# Patient Record
Sex: MALE | Race: WHITE | NOT HISPANIC OR LATINO
[De-identification: names, ages, dates, MRNs, and addresses within clinical notes are randomized per-mention and may not be internally consistent; named-entity substitution may affect disease eponyms.]

---

## 2019-02-11 PROBLEM — Z00.00 ENCOUNTER FOR PREVENTIVE HEALTH EXAMINATION: Status: ACTIVE | Noted: 2019-02-11

## 2019-03-01 ENCOUNTER — APPOINTMENT (OUTPATIENT)
Dept: CARDIOLOGY | Facility: CLINIC | Age: 69
End: 2019-03-01
Payer: MEDICARE

## 2019-03-01 ENCOUNTER — NON-APPOINTMENT (OUTPATIENT)
Age: 69
End: 2019-03-01

## 2019-03-01 VITALS
SYSTOLIC BLOOD PRESSURE: 180 MMHG | BODY MASS INDEX: 28.31 KG/M2 | HEIGHT: 72 IN | WEIGHT: 209 LBS | DIASTOLIC BLOOD PRESSURE: 88 MMHG

## 2019-03-01 VITALS
SYSTOLIC BLOOD PRESSURE: 180 MMHG | DIASTOLIC BLOOD PRESSURE: 88 MMHG | BODY MASS INDEX: 28.31 KG/M2 | WEIGHT: 209 LBS | OXYGEN SATURATION: 95 % | HEIGHT: 72 IN | HEART RATE: 91 BPM

## 2019-03-01 DIAGNOSIS — Z78.9 OTHER SPECIFIED HEALTH STATUS: ICD-10-CM

## 2019-03-01 DIAGNOSIS — Z87.891 PERSONAL HISTORY OF NICOTINE DEPENDENCE: ICD-10-CM

## 2019-03-01 DIAGNOSIS — Z86.19 PERSONAL HISTORY OF OTHER INFECTIOUS AND PARASITIC DISEASES: ICD-10-CM

## 2019-03-01 DIAGNOSIS — Z82.49 FAMILY HISTORY OF ISCHEMIC HEART DISEASE AND OTHER DISEASES OF THE CIRCULATORY SYSTEM: ICD-10-CM

## 2019-03-01 DIAGNOSIS — N28.1 CYST OF KIDNEY, ACQUIRED: ICD-10-CM

## 2019-03-01 PROCEDURE — 99203 OFFICE O/P NEW LOW 30 MIN: CPT

## 2019-03-01 PROCEDURE — 93000 ELECTROCARDIOGRAM COMPLETE: CPT

## 2019-03-01 RX ORDER — ALBUTEROL SULFATE 90 UG/1
108 (90 BASE) AEROSOL, METERED RESPIRATORY (INHALATION)
Refills: 0 | Status: ACTIVE | COMMUNITY

## 2019-03-01 RX ORDER — LISINOPRIL 5 MG/1
5 TABLET ORAL DAILY
Refills: 0 | Status: DISCONTINUED | COMMUNITY
End: 2019-03-01

## 2019-03-01 RX ORDER — TIOTROPIUM BROMIDE 18 UG/1
18 CAPSULE ORAL; RESPIRATORY (INHALATION)
Refills: 0 | Status: ACTIVE | COMMUNITY

## 2019-03-01 NOTE — PHYSICAL EXAM
[General Appearance - Well Developed] : well developed [Normal Appearance] : normal appearance [Well Groomed] : well groomed [General Appearance - Well Nourished] : well nourished [No Deformities] : no deformities [General Appearance - In No Acute Distress] : no acute distress [Normal Conjunctiva] : the conjunctiva exhibited no abnormalities [Eyelids - No Xanthelasma] : the eyelids demonstrated no xanthelasmas [Normal Oral Mucosa] : normal oral mucosa [No Oral Pallor] : no oral pallor [No Oral Cyanosis] : no oral cyanosis [Normal Jugular Venous A Waves Present] : normal jugular venous A waves present [Normal Jugular Venous V Waves Present] : normal jugular venous V waves present [No Jugular Venous Aleman A Waves] : no jugular venous aleman A waves [Respiration, Rhythm And Depth] : normal respiratory rhythm and effort [Exaggerated Use Of Accessory Muscles For Inspiration] : no accessory muscle use [Auscultation Breath Sounds / Voice Sounds] : lungs were clear to auscultation bilaterally [Heart Rate And Rhythm] : heart rate and rhythm were normal [Heart Sounds] : normal S1 and S2 [Murmurs] : no murmurs present [Abdomen Soft] : soft [Abdomen Tenderness] : non-tender [Abdomen Mass (___ Cm)] : no abdominal mass palpated [Abnormal Walk] : normal gait [Gait - Sufficient For Exercise Testing] : the gait was sufficient for exercise testing [Nail Clubbing] : no clubbing of the fingernails [Cyanosis, Localized] : no localized cyanosis [Petechial Hemorrhages (___cm)] : no petechial hemorrhages [Skin Color & Pigmentation] : normal skin color and pigmentation [] : no rash [No Venous Stasis] : no venous stasis [Skin Lesions] : no skin lesions [No Skin Ulcers] : no skin ulcer [No Xanthoma] : no  xanthoma was observed [Oriented To Time, Place, And Person] : oriented to person, place, and time [Affect] : the affect was normal [Mood] : the mood was normal [No Anxiety] : not feeling anxious

## 2019-03-01 NOTE — DISCUSSION/SUMMARY
[FreeTextEntry1] : Cirilo  is a 68-year-old male  with medical history detailed above and active medical issues including:\par \par - Dyspnea on exertion, multiple CAD risk factors. Patient will have noninvasive testing with a Lexascan Myoview stress test to assess for obstructive CAD, exercise-induced arrhythmia,  blood pressure response, 2DEcho for LVEF, wall motion, structural heart disease,  carotid and abdominal ultrasound to assess for obstructive PAD. \par \par - Stable abdominal aortic aneurysm 4.7cm contrast CTA Feb 2019 followed by vascular surgeon Dr Coughlin\par \par - Labile hypertension above BP goal <130/80 on lisinopril.  Discontinue lisinopril and start Benicar 10mg daily with followup on blood pressures. Discussed moderate exercise, weight loss, low salt diet, avoidance of alcohol and caffeine. \par \par - Multiple adrenal adenoma seen on contrast CTA 2/4/19.  Patient will followup with endocrinologist Dr Adriana Pelaez. \par \par - Dyslipidemia on statin therapy well tolerated.\par \par - Stable bilateral adrenal adenomas\par \par -Stable exophytic simple right renal cyst, followup with urologist Dr Alicia.\par \par - Tobacco addiction. Smoking cessation has been discussed at length, patient will make an effort to reduce smoking and quit.\par \par Patient will be seen in cardiology follow-up after noninvasive testing.\par \par Advised patient to follow active lifestyle with regular cardiovascular exercise. Patient educated on lifestyle and diet modification with low sodium low fat diet and avoidance of excessive alcohol. Patient is aware to call with any symptoms or concerns. \par \par Cirilo will follow up with Dr Sadiq Anthony primary care. \par

## 2019-03-01 NOTE — REASON FOR VISIT
[Consultation] : a consultation regarding [FreeTextEntry2] : Dyspnea on exertion, AAA, multiple CAD risk factors, Hypertension, dyslipidemia, tobacco addiction [FreeTextEntry1] : Cirilo is a 68-year-old male with history of hypertension, dyslipidemia, tobacco addiction, asthma, abdominal aortic aneurysm 4.7 CM CTA 2/4/19, right renal cyst, bilateral adrenal adenomas.\par \par Patient has dyspnea with moderate exertion.Cardiovascular review of symptoms is negative for exertional chest pain, palpitations, dizziness or syncope.  No PND or orthopnea.  No bleeding or black stool. Patient has mild dependant leg edema end of day. \par \par Patient is not exercising.  Patient is walking 10 minutes with exertional chest pain.  Patient states he is physically active working as a  during the golf season.\par \par Patient had a contrast CT of the abdomen and pelvis 2/4/19 stable infrarenal abdominal aortic aneurysm 4.7cm, stable exophytic simple cyst right kidney, bilateral adrenal adenomas.\par \par Labs January 2090 normal CBC, BMP, LFT, TSH fasting cholesterol total 204, triglyceride 87, , triglyceride 87

## 2019-03-06 ENCOUNTER — APPOINTMENT (OUTPATIENT)
Dept: CARDIOLOGY | Facility: CLINIC | Age: 69
End: 2019-03-06
Payer: MEDICARE

## 2019-03-06 PROCEDURE — 93979 VASCULAR STUDY: CPT

## 2019-03-06 PROCEDURE — 93306 TTE W/DOPPLER COMPLETE: CPT

## 2019-03-06 PROCEDURE — 93880 EXTRACRANIAL BILAT STUDY: CPT

## 2019-03-08 ENCOUNTER — APPOINTMENT (OUTPATIENT)
Dept: CARDIOLOGY | Facility: CLINIC | Age: 69
End: 2019-03-08
Payer: MEDICARE

## 2019-03-08 PROCEDURE — 93015 CV STRESS TEST SUPVJ I&R: CPT

## 2019-03-08 PROCEDURE — A9502: CPT

## 2019-03-08 PROCEDURE — 78452 HT MUSCLE IMAGE SPECT MULT: CPT | Mod: 26

## 2019-03-12 ENCOUNTER — APPOINTMENT (OUTPATIENT)
Dept: CARDIOLOGY | Facility: CLINIC | Age: 69
End: 2019-03-12
Payer: MEDICARE

## 2019-03-12 VITALS
SYSTOLIC BLOOD PRESSURE: 152 MMHG | HEIGHT: 72 IN | DIASTOLIC BLOOD PRESSURE: 88 MMHG | OXYGEN SATURATION: 95 % | HEART RATE: 88 BPM | WEIGHT: 209 LBS | BODY MASS INDEX: 28.31 KG/M2

## 2019-03-12 PROCEDURE — 99215 OFFICE O/P EST HI 40 MIN: CPT

## 2019-03-12 NOTE — REASON FOR VISIT
[Consultation] : a consultation regarding [FreeTextEntry2] : preop aortic graft stent Dr Pelaez 3/25/19 [facility], dyspnea on exertion, AAA, multiple CAD risk factors, Hypertension, dyslipidemia, tobacco addiction [FreeTextEntry1] : Cirilo is a 68-year-old male with history of hypertension, dyslipidemia, tobacco addiction, asthma, abdominal aortic aneurysm 4.7 CM CTA 2/4/19, right renal cyst, bilateral adrenal adenomas.\par \par Patient is preop aortic graft stent Dr Pelaez 3/25/19 [facility]. \par \par Patient has dyspnea with moderate exertion unchanged.Cardiovascular review of symptoms is negative for exertional chest pain, palpitations, dizziness or syncope.  No PND or orthopnea.  No bleeding or black stool. Patient has mild dependant leg edema end of day. \par \par Patient is not exercising.  Patient is walking 10 minutes with exertional chest pain.  Patient states he is physically active working as a  during the golf season. Patient quit smoking 3 weeks ago. \par \par Patient had a contrast CT of the abdomen and pelvis 2/4/19 stable infrarenal abdominal aortic aneurysm 4.7cm, stable exophytic simple cyst right kidney, bilateral adrenal adenomas. Patient is following up with endocrinologist Dr Adriana Pa for adrenal adenoma. \par \par Exercise Myoview stress test March 2019, LVEF 41%, normal LVEF by echo,  inferior hypokinesis, LVE, medium size inferior infarct no ischemia, multifocal PVCs and NSVT, no chest pain, 80% MPHR, 4 minutes Narinder protocol, baseline sinus rhythm NSST, RAD\par \par Echocardiogram March 2019, LVEF 55%, mild diastolic dysfunction, normal Doppler, normal RVSP\par \par Carotid and abdominal ultrasound March 2019, mild nonobstructive plaque, mid abdominal aortic aneurysm 4.6cm\par \par Labs January 2090 normal CBC, BMP, LFT, TSH fasting cholesterol total 204, triglyceride 87, , triglyceride 87

## 2019-03-12 NOTE — DISCUSSION/SUMMARY
[FreeTextEntry1] : Cirilo  is a 68-year-old male  with medical history detailed above and active medical issues including:\par \par - Abdominal aortic aneurysm 4.7cm contrast CTA Feb 2019 followed by vascular surgeon Dr Coughlin. Preop aortic graft stent Dr Pelaez 3/25/19 [facility]. Patient is optimized from a cardiovascular perspective and may proceed with surgery. Continue current blood pressure medication up to the time of surgery.  Please call with any further questions. \par \par -  Dyspnea on exertion, multiple CAD risk factors. Moderate inferior infarct without ischemia, normal LVEF by echo, March 2019\par \par - Labile hypertension above BP goal <130/80 on Benicar 20 mg daily.   Increase Toprol to 50 mg daily with followup home BP and home cuff calibration next office visit. Discussed moderate exercise, weight loss, low salt diet, avoidance of alcohol and caffeine. \par \par - Frequent PVCs, NSVT on Toprol. Holter monitor for evaluation of arrhythmia.\par \par \par - Multiple adrenal adenoma seen on contrast CTA 2/4/19.  Patient will followup with endocrinologist Dr Adriana Pelaez. \par \par - Dyslipidemia on statin therapy well tolerated.\par \par -Stable exophytic simple right renal cyst, followup with urologist Dr Alicia.\par \par - Tobacco addiction. Smoking cessation has been discussed at length, patient will make an effort to reduce smoking and quit. Patient quit smoking 3 weeks ago. \par \par Patient will be seen in cardiology follow-up after noninvasive testing.\par \par Advised patient to follow active lifestyle with regular cardiovascular exercise. Patient educated on lifestyle and diet modification with low sodium low fat diet and avoidance of excessive alcohol. Patient is aware to call with any symptoms or concerns. \par \par Cirilo will follow up with Dr Sadiq Anthony primary care. \par

## 2019-03-14 ENCOUNTER — APPOINTMENT (OUTPATIENT)
Dept: CARDIOLOGY | Facility: CLINIC | Age: 69
End: 2019-03-14

## 2019-03-14 PROCEDURE — 93224 XTRNL ECG REC UP TO 48 HRS: CPT

## 2019-03-28 ENCOUNTER — INPATIENT (INPATIENT)
Facility: HOSPITAL | Age: 69
LOS: 0 days | Discharge: HOME | End: 2019-03-29

## 2019-04-12 ENCOUNTER — APPOINTMENT (OUTPATIENT)
Dept: CARDIOLOGY | Facility: CLINIC | Age: 69
End: 2019-04-12
Payer: MEDICARE

## 2019-04-12 ENCOUNTER — RX RENEWAL (OUTPATIENT)
Age: 69
End: 2019-04-12

## 2019-04-12 VITALS
HEIGHT: 72 IN | SYSTOLIC BLOOD PRESSURE: 146 MMHG | WEIGHT: 209 LBS | HEART RATE: 93 BPM | DIASTOLIC BLOOD PRESSURE: 80 MMHG | BODY MASS INDEX: 28.31 KG/M2

## 2019-04-12 PROCEDURE — 99214 OFFICE O/P EST MOD 30 MIN: CPT

## 2019-04-12 RX ORDER — TIOTROPIUM BROMIDE INHALATION SPRAY 3.12 UG/1
2.5 SPRAY, METERED RESPIRATORY (INHALATION)
Qty: 4 | Refills: 0 | Status: ACTIVE | COMMUNITY
Start: 2019-03-14

## 2019-04-12 RX ORDER — METOPROLOL SUCCINATE 50 MG/1
50 TABLET, EXTENDED RELEASE ORAL
Qty: 135 | Refills: 1 | Status: DISCONTINUED | COMMUNITY
Start: 2019-03-08 | End: 2019-04-12

## 2019-04-12 NOTE — REASON FOR VISIT
[Consultation] : a consultation regarding [FreeTextEntry2] : hypertension, dyspnea on exertion, AAA, multiple CAD risk factors, dyslipidemia, tobacco addiction, postop aortic graft stent Dr Pelaez 3/25/19 [facility]  [FreeTextEntry1] : Cirilo is a 68-year-old male with history of hypertension, dyslipidemia, tobacco addiction, asthma, abdominal aortic aneurysm 4.7 CM CTA 2/4/19, right renal cyst, bilateral adrenal adenomas.\par \par Patient is stable postop aortic graft stent Dr Pelaez 3/25/19 [facility] [address] [facility]. Blood pressure remains above goal <130/80, average home blood pressure is 140/90.  Toprol will be increased from 50 mg daily to 75 mg daily with followup home blood pressures and home cuff calibration one week.  \par \par Patient has dyspnea with moderate exertion unchanged.Cardiovascular review of symptoms is negative for exertional chest pain, palpitations, dizziness or syncope.  No PND or orthopnea.  No bleeding or black stool. Patient has mild dependant leg edema end of day. \par \par Patient is not exercising.  Patient is walking 10 minutes with exertional chest pain.  Patient states he is physically active working as a  during the golf season. Patient reduced smoking 3-4 cigarettes daily. Patient is actively trying to quit smoking\par \par Patient had a contrast CT of the abdomen and pelvis 2/4/19 stable infrarenal abdominal aortic aneurysm 4.7cm, stable exophytic simple cyst right kidney, bilateral adrenal adenomas. Patient is following up with endocrinologist Dr Adriana Pa for adrenal adenoma. \par \par Exercise Myoview stress test March 2019, LVEF 41%, normal LVEF by echo,  inferior hypokinesis, LVE, medium size inferior infarct no ischemia, multifocal PVCs and NSVT, no chest pain, 80% MPHR, 4 minutes Narinder protocol, baseline sinus rhythm NSST, RAD\par \par Echocardiogram March 2019, LVEF 55%, mild diastolic dysfunction, normal Doppler, normal RVSP\par \par Carotid and abdominal ultrasound March 2019, mild nonobstructive plaque, mid abdominal aortic aneurysm 4.6cm\par \par Labs January 2090 normal CBC, BMP, LFT, TSH fasting cholesterol total 204, triglyceride 87, , triglyceride 87

## 2019-04-12 NOTE — DISCUSSION/SUMMARY
[FreeTextEntry1] : Cirilo  is a 68-year-old male  with medical history detailed above and active medical issues including:\par \par - Patient is stable postop aortic graft stent Dr Pelaez 3/25/19 [facility]. \par \par -  Dyspnea on exertion, multiple CAD risk factors. 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 Blood pressure remains above goal <130/80, average home blood pressure is 140/90.  Toprol will be increased from 50 mg daily to 75 mg daily with followup home blood pressures and home cuff calibration one week.  Discussed moderate exercise, weight loss, low salt diet, avoidance of alcohol and caffeine. \par \par - Frequent PVCs, NSVT on Toprol. Holter monitor for evaluation of arrhythmia.\par \par - Multiple adrenal adenoma seen on contrast CTA 2/4/19.  Patient will followup with endocrinologist Dr Adriana Pelaez. \par \par - Dyslipidemia on statin therapy well tolerated.\par \par -Stable exophytic simple right renal cyst, followup with urologist Dr Alicia.\par \par - Tobacco addiction. Smoking cessation has been discussed at length, patient will make an effort to reduce smoking and quit. Patient quit smoking 3 weeks ago. \par \par Patient will be seen in cardiology follow-up 6 months. \par \par Advised patient to follow active lifestyle with regular cardiovascular exercise. Patient educated on lifestyle and diet modification with low sodium low fat diet and avoidance of excessive alcohol. Patient is aware to call with any symptoms or concerns. \par \par Cirilo will follow up with Dr Sadiq Anthony primary care. \par

## 2019-04-12 NOTE — PHYSICAL EXAM
[General Appearance - Well Developed] : well developed [Normal Appearance] : normal appearance [Well Groomed] : well groomed [General Appearance - Well Nourished] : well nourished [No Deformities] : no deformities [General Appearance - In No Acute Distress] : no acute distress [Eyelids - No Xanthelasma] : the eyelids demonstrated no xanthelasmas [Normal Conjunctiva] : the conjunctiva exhibited no abnormalities [No Oral Cyanosis] : no oral cyanosis [Normal Oral Mucosa] : normal oral mucosa [No Oral Pallor] : no oral pallor [Normal Jugular Venous A Waves Present] : normal jugular venous A waves present [No Jugular Venous Aleman A Waves] : no jugular venous aleman A waves [Normal Jugular Venous V Waves Present] : normal jugular venous V waves present [Exaggerated Use Of Accessory Muscles For Inspiration] : no accessory muscle use [Respiration, Rhythm And Depth] : normal respiratory rhythm and effort [Auscultation Breath Sounds / Voice Sounds] : lungs were clear to auscultation bilaterally [Heart Rate And Rhythm] : heart rate and rhythm were normal [Heart Sounds] : normal S1 and S2 [Murmurs] : no murmurs present [Abdomen Soft] : soft [Abdomen Tenderness] : non-tender [Abdomen Mass (___ Cm)] : no abdominal mass palpated [Abnormal Walk] : normal gait [Nail Clubbing] : no clubbing of the fingernails [Gait - Sufficient For Exercise Testing] : the gait was sufficient for exercise testing [Petechial Hemorrhages (___cm)] : no petechial hemorrhages [Cyanosis, Localized] : no localized cyanosis [Skin Color & Pigmentation] : normal skin color and pigmentation [] : no rash [Skin Lesions] : no skin lesions [No Skin Ulcers] : no skin ulcer [No Venous Stasis] : no venous stasis [No Xanthoma] : no  xanthoma was observed [Affect] : the affect was normal [Oriented To Time, Place, And Person] : oriented to person, place, and time [Mood] : the mood was normal [No Anxiety] : not feeling anxious

## 2019-11-08 ENCOUNTER — APPOINTMENT (OUTPATIENT)
Dept: CARDIOLOGY | Facility: CLINIC | Age: 69
End: 2019-11-08

## 2020-03-19 ENCOUNTER — APPOINTMENT (OUTPATIENT)
Dept: CARDIOLOGY | Facility: CLINIC | Age: 70
End: 2020-03-19

## 2020-07-08 ENCOUNTER — APPOINTMENT (OUTPATIENT)
Dept: CARDIOLOGY | Facility: CLINIC | Age: 70
End: 2020-07-08

## 2021-09-07 ENCOUNTER — APPOINTMENT (OUTPATIENT)
Dept: CARDIOLOGY | Facility: CLINIC | Age: 71
End: 2021-09-07

## 2021-09-09 ENCOUNTER — APPOINTMENT (OUTPATIENT)
Dept: CARDIOLOGY | Facility: CLINIC | Age: 71
End: 2021-09-09
Payer: MEDICARE

## 2021-09-09 ENCOUNTER — NON-APPOINTMENT (OUTPATIENT)
Age: 71
End: 2021-09-09

## 2021-09-09 VITALS
TEMPERATURE: 97.6 F | HEART RATE: 59 BPM | DIASTOLIC BLOOD PRESSURE: 84 MMHG | SYSTOLIC BLOOD PRESSURE: 152 MMHG | HEIGHT: 72 IN | OXYGEN SATURATION: 97 % | BODY MASS INDEX: 26.82 KG/M2 | WEIGHT: 198 LBS

## 2021-09-09 PROCEDURE — 93000 ELECTROCARDIOGRAM COMPLETE: CPT

## 2021-09-09 PROCEDURE — 99214 OFFICE O/P EST MOD 30 MIN: CPT

## 2021-09-09 NOTE — REASON FOR VISIT
[Other: ____] : [unfilled] [FreeTextEntry1] : Cirilo is a 70-year-old male with history of hypertension, dyslipidemia, tobacco addiction, asthma, abdominal aortic aneurysm 4.7 CM CTA 2/4/19, right renal cyst, bilateral adrenal adenomas.\par \par Patient has dyspnea with moderate exertion. Cardiovascular review of symptoms is negative for exertional chest pain, palpitations, dizziness or syncope.  No PND or orthopnea.  No bleeding or black stool. Patient has mild dependant leg edema end of day. \par \par No exercise routine.  Patient is walking 10 minutes with exertional chest pain.  Patient states he is physically active working as a  during the golf season. Patient reduced smoking 3-4 cigarettes daily. Patient is actively trying to quit smoking\par \par Patient is stable postop aortic graft stent Dr Pelaez 3/25/19 [facility]. \par \par Blood pressure remains above guideline goal with HR 50s on Toprol and Benicar, dose of Benicar increased to 40mg daily with followup home BPs. Home BP cuff will be calibrated. \par \par Patient had a contrast CT of the abdomen and pelvis 2/4/19 stable infrarenal abdominal aortic aneurysm 4.7cm, stable exophytic simple cyst right kidney, bilateral adrenal adenomas. Patient is following up with endocrinologist Dr Adriana Pa for adrenal adenoma. \par \par Exercise Myoview stress test March 2019, LVEF 41%, normal LVEF by echo,  inferior hypokinesis, LVE, medium size inferior infarct no ischemia, multifocal PVCs and NSVT, no chest pain, 80% MPHR, 4 minutes Narinder protocol, baseline sinus rhythm NSST, RAD\par \par Echocardiogram March 2019, LVEF 55%, mild diastolic dysfunction, normal Doppler, normal RVSP\par \par Carotid and abdominal ultrasound March 2019, mild nonobstructive plaque, mid abdominal aortic aneurysm 4.6cm\par \par Labs January 2090 normal CBC, BMP, LFT, TSH fasting cholesterol total 204, triglyceride 87, , triglyceride 87

## 2021-09-09 NOTE — DISCUSSION/SUMMARY
[FreeTextEntry1] : Cirilo  is a 70-year-old male  with medical history detailed above and active medical issues including:\par \par -  Dyspnea on exertion, multiple CAD risk factors.  Patient will have noninvasive testing with exercise stress echo to assess for obstructive CAD, HR and BP response, exercise-induced arrhythmia, echocardiogram for LVEF, structural heart disease, carotid and abdominal ultrasound to assess for obstructive PAD.\par \par - PVD,  aortic graft stent Dr Pelaez 3/25/19 [facility]. \par \par - Labile hypertension.  Blood pressure remains above guideline goal with HR 50s on Toprol and Benicar, dose of Benicar increased to 40mg daily with followup home BPs. Home BP cuff will be calibrated. \par \par - Frequent PVCs, NSVT on Toprol. Holter monitor for evaluation of arrhythmia.\par \par - Multiple adrenal adenoma seen on contrast CTA 2/4/19.  Patient will followup with endocrinologist Dr Adriana Pelaez. \par \par - Dyslipidemia on statin therapy well tolerated.\par \par - Stable exophytic simple right renal cyst, followup with urologist Dr Alicia.\par \par - Tobacco addiction. Smoking cessation has been discussed at length, patient will make an effort to reduce smoking and quit. Patient quit smoking 3 weeks ago. \par \par Patient will be seen in cardiology follow-up after noninvasive testing. \par \par Advised patient to follow active lifestyle with regular cardiovascular exercise. Patient educated on lifestyle and diet modification with low sodium low fat diet and avoidance of excessive alcohol. Patient is aware to call with any symptoms or concerns. \par \par Cirilo will follow up with Dr Sadiq Anthony primary care. \par

## 2021-09-09 NOTE — PHYSICAL EXAM
[General Appearance - Well Developed] : well developed [Normal Appearance] : normal appearance [Well Groomed] : well groomed [General Appearance - Well Nourished] : well nourished [No Deformities] : no deformities [General Appearance - In No Acute Distress] : no acute distress [Eyelids - No Xanthelasma] : the eyelids demonstrated no xanthelasmas [Normal Oral Mucosa] : normal oral mucosa [No Oral Pallor] : no oral pallor [No Oral Cyanosis] : no oral cyanosis [Normal Jugular Venous A Waves Present] : normal jugular venous A waves present [Normal Jugular Venous V Waves Present] : normal jugular venous V waves present [No Jugular Venous Aleman A Waves] : no jugular venous aleman A waves [Respiration, Rhythm And Depth] : normal respiratory rhythm and effort [Exaggerated Use Of Accessory Muscles For Inspiration] : no accessory muscle use [Auscultation Breath Sounds / Voice Sounds] : lungs were clear to auscultation bilaterally [Heart Rate And Rhythm] : heart rate and rhythm were normal [Heart Sounds] : normal S1 and S2 [Murmurs] : no murmurs present [Abdomen Tenderness] : non-tender [Abdomen Soft] : soft [Abdomen Mass (___ Cm)] : no abdominal mass palpated [Abnormal Walk] : normal gait [Gait - Sufficient For Exercise Testing] : the gait was sufficient for exercise testing [Nail Clubbing] : no clubbing of the fingernails [Cyanosis, Localized] : no localized cyanosis [Petechial Hemorrhages (___cm)] : no petechial hemorrhages [Skin Color & Pigmentation] : normal skin color and pigmentation [] : no rash [No Venous Stasis] : no venous stasis [Skin Lesions] : no skin lesions [No Skin Ulcers] : no skin ulcer [No Xanthoma] : no  xanthoma was observed [Oriented To Time, Place, And Person] : oriented to person, place, and time [Affect] : the affect was normal [Mood] : the mood was normal [No Anxiety] : not feeling anxious [Well Developed] : well developed [Well Nourished] : well nourished [No Acute Distress] : no acute distress [Normal Conjunctiva] : normal conjunctiva [Normal Venous Pressure] : normal venous pressure [No Carotid Bruit] : no carotid bruit [Normal S1, S2] : normal S1, S2 [No Murmur] : no murmur [No Rub] : no rub [No Gallop] : no gallop [Clear Lung Fields] : clear lung fields [Good Air Entry] : good air entry [No Respiratory Distress] : no respiratory distress  [Soft] : abdomen soft [Non Tender] : non-tender [No Masses/organomegaly] : no masses/organomegaly [Normal Bowel Sounds] : normal bowel sounds [Normal Gait] : normal gait [No Edema] : no edema [No Cyanosis] : no cyanosis [No Clubbing] : no clubbing [No Varicosities] : no varicosities [No Rash] : no rash [No Skin Lesions] : no skin lesions [Moves all extremities] : moves all extremities [Normal Speech] : normal speech [No Focal Deficits] : no focal deficits [Alert and Oriented] : alert and oriented [Normal memory] : normal memory

## 2021-11-11 ENCOUNTER — APPOINTMENT (OUTPATIENT)
Dept: CARDIOLOGY | Facility: CLINIC | Age: 71
End: 2021-11-11
Payer: MEDICARE

## 2021-11-11 PROCEDURE — 93306 TTE W/DOPPLER COMPLETE: CPT

## 2021-11-11 PROCEDURE — 93979 VASCULAR STUDY: CPT

## 2021-11-11 PROCEDURE — 93880 EXTRACRANIAL BILAT STUDY: CPT

## 2021-11-18 ENCOUNTER — APPOINTMENT (OUTPATIENT)
Dept: CARDIOLOGY | Facility: CLINIC | Age: 71
End: 2021-11-18

## 2021-12-16 ENCOUNTER — APPOINTMENT (OUTPATIENT)
Dept: CARDIOLOGY | Facility: CLINIC | Age: 71
End: 2021-12-16

## 2021-12-21 ENCOUNTER — APPOINTMENT (OUTPATIENT)
Dept: CARDIOLOGY | Facility: CLINIC | Age: 71
End: 2021-12-21
Payer: MEDICARE

## 2021-12-21 PROCEDURE — A9502: CPT

## 2021-12-21 PROCEDURE — 78452 HT MUSCLE IMAGE SPECT MULT: CPT

## 2021-12-21 PROCEDURE — 93015 CV STRESS TEST SUPVJ I&R: CPT

## 2022-01-12 ENCOUNTER — APPOINTMENT (OUTPATIENT)
Dept: CARDIOLOGY | Facility: CLINIC | Age: 72
End: 2022-01-12
Payer: MEDICARE

## 2022-01-12 VITALS
SYSTOLIC BLOOD PRESSURE: 132 MMHG | BODY MASS INDEX: 24.3 KG/M2 | HEART RATE: 93 BPM | TEMPERATURE: 96.9 F | WEIGHT: 210 LBS | OXYGEN SATURATION: 93 % | HEIGHT: 78 IN | DIASTOLIC BLOOD PRESSURE: 84 MMHG

## 2022-01-12 DIAGNOSIS — I47.2 VENTRICULAR TACHYCARDIA: ICD-10-CM

## 2022-01-12 DIAGNOSIS — I10 ESSENTIAL (PRIMARY) HYPERTENSION: ICD-10-CM

## 2022-01-12 DIAGNOSIS — R06.00 DYSPNEA, UNSPECIFIED: ICD-10-CM

## 2022-01-12 DIAGNOSIS — E78.5 HYPERLIPIDEMIA, UNSPECIFIED: ICD-10-CM

## 2022-01-12 DIAGNOSIS — D35.00 BENIGN NEOPLASM OF UNSPECIFIED ADRENAL GLAND: ICD-10-CM

## 2022-01-12 DIAGNOSIS — I71.4 ABDOMINAL AORTIC ANEURYSM, W/OUT RUPTURE: ICD-10-CM

## 2022-01-12 DIAGNOSIS — J44.9 CHRONIC OBSTRUCTIVE PULMONARY DISEASE, UNSPECIFIED: ICD-10-CM

## 2022-01-12 PROCEDURE — 99214 OFFICE O/P EST MOD 30 MIN: CPT

## 2022-01-12 RX ORDER — ATORVASTATIN CALCIUM 20 MG/1
20 TABLET, FILM COATED ORAL DAILY
Qty: 90 | Refills: 1 | Status: ACTIVE | COMMUNITY
Start: 1900-01-01 | End: 1900-01-01

## 2022-01-12 RX ORDER — OXYCODONE AND ACETAMINOPHEN 5; 325 MG/1; MG/1
5-325 TABLET ORAL
Qty: 20 | Refills: 0 | Status: DISCONTINUED | COMMUNITY
Start: 2019-03-29 | End: 2022-01-12

## 2022-01-12 RX ORDER — OLMESARTAN MEDOXOMIL 40 MG/1
40 TABLET, FILM COATED ORAL DAILY
Qty: 90 | Refills: 1 | Status: ACTIVE | COMMUNITY
Start: 2019-03-01 | End: 1900-01-01

## 2022-01-12 RX ORDER — METOPROLOL SUCCINATE 50 MG/1
50 TABLET, EXTENDED RELEASE ORAL
Qty: 145 | Refills: 1 | Status: ACTIVE | COMMUNITY
Start: 1900-01-01 | End: 1900-01-01

## 2022-01-12 NOTE — DISCUSSION/SUMMARY
[FreeTextEntry1] : Cirilo  is a 71-year-old male  with medical history detailed above and active medical issues including:\par \par - No anginal symptoms, moderate inferior infarct without ischemia on Myoview stress test Dec 2021.  Mildly reduced LVEF 50% on echo Nov 2021.\par \par - PVD,  aortic graft stent Dr Pelaez 3/25/19 [facility]. \par \par - Labile hypertension.  Average resting Home BPs close to guideline goal on Toprol and Benicar, continue monitoring resting home BPs to confirm at guideline goal\par \par - Frequent PVCs, NSVT on Toprol. Holter monitor for evaluation of arrhythmia.\par \par - Multiple adrenal adenoma seen on contrast CTA 2/4/19.  Patient will followup with endocrinologist Dr Adriana Pelaez. \par \par - Dyslipidemia on statin therapy well tolerated.\par \par - Stable exophytic simple right renal cyst, followup with urologist Dr Alicia.\par \par - Tobacco addiction. Smoking cessation has been discussed at length, patient will make an effort to reduce smoking and quit. Patient quit smoking 3 weeks ago. \par \par Patient will be seen in cardiology follow-up 6 months.  Current cardiac medications remain unchanged and renewals  are up to date. Repeat labs will be ordered with PMD.\par \par Advised patient to follow active lifestyle with regular cardiovascular exercise. Patient educated on lifestyle and diet modification with low sodium low fat diet and avoidance of excessive alcohol. Patient is aware to call with any symptoms or concerns. \par \par Cirilo will follow up with Dr Sadiq Anthony primary care. \par

## 2022-01-12 NOTE — REASON FOR VISIT
[Other: ____] : [unfilled] [FreeTextEntry1] : Cirilo is a 71-year-old male with history of hypertension, dyslipidemia, tobacco addiction, asthma, abdominal aortic aneurysm 4.7 CM CTA 2/4/19, right renal cyst, bilateral adrenal adenomas, IMI.\par \par Patient has dyspnea with moderate exertion. Cardiovascular review of symptoms is negative for exertional chest pain, palpitations, dizziness or syncope.  No PND or orthopnea.  No bleeding or black stool. Patient has mild dependant leg edema end of day. \par \par No exercise routine.  Patient is walking 10 minutes with exertional chest pain.  Patient states he is physically active working as a  during the golf season. \par \par Patient reduced smoking 3-4 cigarettes daily. Patient is actively trying to quit smoking\par \par Patient is stable postop aortic graft stent Dr Pelaez 3/25/19 [facility]. \par \par Blood pressure remains above guideline goal with HR 50s on Toprol and Benicar, dose of Benicar increased to 40mg daily with followup home BPs. Home BP cuff will be calibrated. \par \par Patient had a contrast CT of the abdomen and pelvis 2/4/19 stable infrarenal abdominal aortic aneurysm 4.7cm, stable exophytic simple cyst right kidney, bilateral adrenal adenomas. Patient is following up with endocrinologist Dr Adriana Pa for adrenal adenoma. \par \par Exercise Myoview stress test March 2019, LVEF 41%, normal LVEF by echo,  inferior hypokinesis, LVE, medium size inferior infarct no ischemia, multifocal PVCs and NSVT, no chest pain, 80% MPHR, 4 minutes Narinder protocol, baseline sinus rhythm NSST, RAD\par \par Echocardiogram March 2019, LVEF 55%, mild diastolic dysfunction, normal Doppler, normal RVSP\par \par Carotid and abdominal ultrasound March 2019, mild nonobstructive plaque, mid abdominal aortic aneurysm 4.6cm\par \par Labs January 2090 normal CBC, BMP, LFT, TSH fasting cholesterol total 204, triglyceride 87, , triglyceride 87

## 2022-01-12 NOTE — PHYSICAL EXAM
[Well Developed] : well developed [Well Nourished] : well nourished [No Acute Distress] : no acute distress [Normal Venous Pressure] : normal venous pressure [No Carotid Bruit] : no carotid bruit [Normal S1, S2] : normal S1, S2 [No Murmur] : no murmur [No Rub] : no rub [No Gallop] : no gallop [Clear Lung Fields] : clear lung fields [Good Air Entry] : good air entry [No Respiratory Distress] : no respiratory distress  [Soft] : abdomen soft [Non Tender] : non-tender [No Masses/organomegaly] : no masses/organomegaly [Normal Bowel Sounds] : normal bowel sounds [Normal Gait] : normal gait [No Edema] : no edema [No Cyanosis] : no cyanosis [No Clubbing] : no clubbing [No Varicosities] : no varicosities [No Rash] : no rash [No Skin Lesions] : no skin lesions [Moves all extremities] : moves all extremities [No Focal Deficits] : no focal deficits [Normal Speech] : normal speech [Alert and Oriented] : alert and oriented [Normal memory] : normal memory [General Appearance - Well Developed] : well developed [Normal Appearance] : normal appearance [Well Groomed] : well groomed [General Appearance - Well Nourished] : well nourished [No Deformities] : no deformities [General Appearance - In No Acute Distress] : no acute distress [Normal Conjunctiva] : the conjunctiva exhibited no abnormalities [Eyelids - No Xanthelasma] : the eyelids demonstrated no xanthelasmas [Normal Oral Mucosa] : normal oral mucosa [No Oral Pallor] : no oral pallor [No Oral Cyanosis] : no oral cyanosis [Normal Jugular Venous A Waves Present] : normal jugular venous A waves present [Normal Jugular Venous V Waves Present] : normal jugular venous V waves present [No Jugular Venous Aleman A Waves] : no jugular venous aleman A waves [Respiration, Rhythm And Depth] : normal respiratory rhythm and effort [Exaggerated Use Of Accessory Muscles For Inspiration] : no accessory muscle use [Auscultation Breath Sounds / Voice Sounds] : lungs were clear to auscultation bilaterally [Heart Rate And Rhythm] : heart rate and rhythm were normal [Heart Sounds] : normal S1 and S2 [Murmurs] : no murmurs present [Abdomen Soft] : soft [Abdomen Tenderness] : non-tender [Abdomen Mass (___ Cm)] : no abdominal mass palpated [Abnormal Walk] : normal gait [Gait - Sufficient For Exercise Testing] : the gait was sufficient for exercise testing [Nail Clubbing] : no clubbing of the fingernails [Cyanosis, Localized] : no localized cyanosis [Petechial Hemorrhages (___cm)] : no petechial hemorrhages [Skin Color & Pigmentation] : normal skin color and pigmentation [] : no rash [No Venous Stasis] : no venous stasis [Skin Lesions] : no skin lesions [No Skin Ulcers] : no skin ulcer [No Xanthoma] : no  xanthoma was observed [Oriented To Time, Place, And Person] : oriented to person, place, and time [Affect] : the affect was normal [Mood] : the mood was normal [No Anxiety] : not feeling anxious

## 2023-08-25 ENCOUNTER — NON-APPOINTMENT (OUTPATIENT)
Age: 73
End: 2023-08-25